# Patient Record
Sex: FEMALE | Race: OTHER | Employment: UNEMPLOYED | URBAN - METROPOLITAN AREA
[De-identification: names, ages, dates, MRNs, and addresses within clinical notes are randomized per-mention and may not be internally consistent; named-entity substitution may affect disease eponyms.]

---

## 2024-03-27 ENCOUNTER — HOSPITAL ENCOUNTER (EMERGENCY)
Age: 24
Discharge: HOME | End: 2024-03-27
Attending: EMERGENCY MEDICINE
Payer: COMMERCIAL

## 2024-03-27 VITALS
WEIGHT: 200 LBS | DIASTOLIC BLOOD PRESSURE: 65 MMHG | HEART RATE: 87 BPM | TEMPERATURE: 97.6 F | SYSTOLIC BLOOD PRESSURE: 113 MMHG | OXYGEN SATURATION: 98 % | RESPIRATION RATE: 16 BRPM

## 2024-03-27 DIAGNOSIS — T23.272A PARTIAL THICKNESS BURN OF LEFT WRIST, INITIAL ENCOUNTER: Primary | ICD-10-CM

## 2024-03-27 PROCEDURE — 99282 EMERGENCY DEPT VISIT SF MDM: CPT

## 2024-03-27 RX ORDER — GINSENG 100 MG
CAPSULE ORAL 3 TIMES DAILY
Status: DISCONTINUED | OUTPATIENT
Start: 2024-03-27 | End: 2024-03-27 | Stop reason: HOSPADM

## 2024-03-27 NOTE — ED TRIAGE NOTES
Pt. A/ox4 and ambulatory to triage. Pt. Presents with burn on inner left wrist. Pt. States burning self on stove on Sunday. Pt. C/o pain

## 2024-03-27 NOTE — ED PROVIDER NOTES
Emergency Department Provider Note       PCP: No, Pcp   Age: 24 y.o.   Sex: female     DISPOSITION Discharge - Pending Orders Complete 03/27/2024 03:31:04 AM       ICD-10-CM    1. Partial thickness burn of left wrist, initial encounter  T23.272A           Medical Decision Making     1% or slightly less than 1% area of partial-thickness burn left wrist.  Over-the-counter bacitracin and wound care recommended.  Tylenol and Motrin for pain and morphine prescription for breakthrough pain will be provided for a couple of days.  No indication for burn center.  Wound is noncircumferential     1 acute complicated illness or injury.  Over the counter drug management performed.  Prescription drug management performed.  Shared medical decision making was utilized in creating the patients health plan today.    I independently ordered and reviewed each unique test.                     History     Patient with burn to left volar wrist area when boiling water/hot fluid splashed onto this area few days ago on Sunday.  Some areas of blistering present and central area about the size of a quarter the blister ruptured revealing soft exposed tender skin.  No fever and no purulent drainage          ROS     Review of Systems   Constitutional:  Negative for fever.   Skin:  Positive for wound.        Physical Exam     Vitals signs and nursing note reviewed:  Vitals:    03/27/24 0305 03/27/24 0309   BP: 113/65    Pulse: 87    Resp: 16    Temp:  97.6 °F (36.4 °C)   TempSrc:  Oral   SpO2: 98%    Weight:  90.7 kg (200 lb)      Physical Exam  Vitals and nursing note reviewed.   Constitutional:       General: She is not in acute distress.     Appearance: She is not ill-appearing.   Skin:     General: Skin is warm and dry.             Comments: Burn is not circumferential and is located to the volar surface of the left distal forearm/wrist area only   Neurological:      Mental Status: She is alert.        Procedures     Procedures    Orders

## 2024-03-27 NOTE — DISCHARGE INSTRUCTIONS
Clean twice daily with antibacterial hand soap and water, dry, apply over-the-counter bacitracin ointment and cover with a nonadherent Band-Aid or bandage.  Call the plastic surgeon provided for follow-up and recheck in a few days if not improving.  Return if signs of infection with increased pain, redness, warmth or pus.

## 2024-03-27 NOTE — ED NOTES
Patient mobility status  with no difficulty. Provider aware     I have reviewed discharge instructions with the patient.  The patient verbalized understanding.    Patient left ED via Discharge Method: ambulatory to Home with Significant Other.    Opportunity for questions and clarification provided.     Patient given 0 scripts.